# Patient Record
Sex: MALE | Race: WHITE | Employment: FULL TIME | URBAN - NONMETROPOLITAN AREA
[De-identification: names, ages, dates, MRNs, and addresses within clinical notes are randomized per-mention and may not be internally consistent; named-entity substitution may affect disease eponyms.]

---

## 2021-05-18 ENCOUNTER — HOSPITAL ENCOUNTER (EMERGENCY)
Age: 41
Discharge: HOME | End: 2021-05-18
Attending: SPECIALIST
Payer: COMMERCIAL

## 2021-05-18 VITALS
HEIGHT: 67 IN | TEMPERATURE: 100.8 F | HEART RATE: 102 BPM | DIASTOLIC BLOOD PRESSURE: 91 MMHG | SYSTOLIC BLOOD PRESSURE: 138 MMHG | OXYGEN SATURATION: 97 % | RESPIRATION RATE: 16 BRPM | BODY MASS INDEX: 36.88 KG/M2 | WEIGHT: 235 LBS

## 2021-05-18 DIAGNOSIS — R53.83 OTHER FATIGUE: ICD-10-CM

## 2021-05-18 DIAGNOSIS — R50.9 FEVER, UNSPECIFIED FEVER CAUSE: Primary | ICD-10-CM

## 2021-05-18 LAB
-: ABNORMAL
AMORPHOUS: ABNORMAL
BACTERIA: ABNORMAL
BILIRUBIN URINE: NEGATIVE
CASTS UA: ABNORMAL /LPF (ref 0–2)
COLOR: NORMAL
COMMENT UA: NORMAL
CRYSTALS, UA: ABNORMAL /HPF
EPITHELIAL CELLS UA: ABNORMAL /HPF (ref 0–5)
GLUCOSE URINE: NEGATIVE
KETONES, URINE: NEGATIVE
LEUKOCYTE ESTERASE, URINE: NEGATIVE
MUCUS: ABNORMAL
NITRITE, URINE: NEGATIVE
OTHER OBSERVATIONS UA: ABNORMAL
PH UA: 6 (ref 5–6)
PROTEIN UA: NEGATIVE
RBC UA: ABNORMAL /HPF (ref 0–4)
RENAL EPITHELIAL, UA: ABNORMAL /HPF
SPECIFIC GRAVITY UA: 1.02 (ref 1.01–1.02)
TRICHOMONAS: ABNORMAL
TURBIDITY: NORMAL
URINE HGB: NEGATIVE
UROBILINOGEN, URINE: NORMAL
WBC UA: ABNORMAL /HPF (ref 0–4)
YEAST: ABNORMAL

## 2021-05-18 PROCEDURE — 81001 URINALYSIS AUTO W/SCOPE: CPT

## 2021-05-18 PROCEDURE — 99285 EMERGENCY DEPT VISIT HI MDM: CPT

## 2021-05-18 PROCEDURE — 6370000000 HC RX 637 (ALT 250 FOR IP): Performed by: SPECIALIST

## 2021-05-18 RX ORDER — ACETAMINOPHEN 500 MG
1000 TABLET ORAL ONCE
Status: COMPLETED | OUTPATIENT
Start: 2021-05-18 | End: 2021-05-18

## 2021-05-18 RX ORDER — TRAMADOL HYDROCHLORIDE 50 MG/1
50 TABLET ORAL EVERY 6 HOURS PRN
COMMUNITY

## 2021-05-18 RX ADMIN — ACETAMINOPHEN 1000 MG: 500 TABLET, FILM COATED ORAL at 22:13

## 2021-05-18 SDOH — HEALTH STABILITY: MENTAL HEALTH: HOW OFTEN DO YOU HAVE A DRINK CONTAINING ALCOHOL?: NEVER

## 2021-05-18 ASSESSMENT — PAIN DESCRIPTION - DESCRIPTORS: DESCRIPTORS: ACHING

## 2021-05-18 ASSESSMENT — PAIN SCALES - GENERAL: PAINLEVEL_OUTOF10: 7

## 2021-05-18 ASSESSMENT — PAIN DESCRIPTION - ONSET: ONSET: ON-GOING

## 2021-05-18 ASSESSMENT — PAIN DESCRIPTION - PAIN TYPE: TYPE: ACUTE PAIN

## 2021-05-19 ASSESSMENT — ENCOUNTER SYMPTOMS
ABDOMINAL PAIN: 0
SINUS PRESSURE: 0
DIARRHEA: 1
SORE THROAT: 0
SHORTNESS OF BREATH: 0
NAUSEA: 0
COUGH: 0
VOMITING: 0

## 2021-05-19 NOTE — ED PROVIDER NOTES
[address]      Pt Name: Donna Crabtree  MRN: [id number]  [place] 1980  Date of evaluation: 5/18/2021      CHIEF COMPLAINT       Chief Complaint   Patient presents with    Fever    Fatigue         HISTORY OF PRESENT ILLNESS    Donna Crabtree is a 37 y.o. male who presents to the emergency department for evaluation of fever, generalized body ache, sinus drainage and fatigue for last 4 days. Patient states he has been feeling warm but temperature not checked. He has had one episode of diarrhea earlier on the day of evaluation but denies any abdominal pain and denies any nausea or vomiting. He also denies any cough, runny nose or sore throat but has noticed some sinus drainage. He denies any sinus pressure and denies any chest pain, sore throat, cough or earache. He also denies any urinary frequency, urgency, dysuria or hematuria and denies any headache, neck stiffness or skin rash. Patient has had Covid 19 infection about 8 months ago. He has not received any Covid vaccination so far. He denies any sick or ill contacts. Patient has been taking Tylenol and ibuprofen that has been controlling his fever but he has not taken any Tylenol on the day of evaluation. There are no exacerbating or relieving factors. REVIEW OF SYSTEMS       Review of Systems   Constitutional: Positive for fatigue and fever. HENT: Negative for congestion, sinus pressure and sore throat. Respiratory: Negative for cough and shortness of breath. Cardiovascular: Negative for palpitations and leg swelling. Gastrointestinal: Positive for diarrhea. Negative for abdominal pain, nausea and vomiting. Genitourinary: Negative for flank pain, frequency, hematuria and testicular pain. Musculoskeletal: Positive for myalgias. Negative for neck pain and neck stiffness. Neurological: Negative for dizziness and headaches. All other systems reviewed and are negative. PAST MEDICAL HISTORY    has a past medical history of Hypertension. SURGICAL HISTORY      has a past surgical history that includes back surgery. CURRENT MEDICATIONS       Discharge Medication List as of 5/18/2021 10:52 PM      CONTINUE these medications which have NOT CHANGED    Details   traMADol (ULTRAM) 50 MG tablet Take 50 mg by mouth every 6 hours as needed for Pain. Historical Med             ALLERGIES     has No Known Allergies. FAMILY HISTORY     has no family status information on file. family history is not on file. SOCIAL HISTORY      reports that he has never smoked. His smokeless tobacco use includes snuff. He reports that he does not drink alcohol and does not use drugs. PHYSICAL EXAM     INITIAL VITALS:  height is 5' 7\" (1.702 m) and weight is 235 lb (106.6 kg). His tympanic temperature is 100.8 °F (38.2 °C). His blood pressure is 138/91 (abnormal) and his pulse is 102. His respiration is 16 and oxygen saturation is 97%. Physical Exam  Vitals and nursing note reviewed. Constitutional:       Appearance: He is well-developed. HENT:      Head: Normocephalic and atraumatic. Nose: Nose normal.   Eyes:      Extraocular Movements: Extraocular movements intact. Pupils: Pupils are equal, round, and reactive to light. Neck:      Meningeal: Brudzinski's sign absent. Cardiovascular:      Rate and Rhythm: Normal rate and regular rhythm. Heart sounds: Normal heart sounds. No murmur heard. Pulmonary:      Effort: Pulmonary effort is normal. No respiratory distress. Breath sounds: Normal breath sounds. Abdominal:      General: Bowel sounds are normal. There is no distension. Palpations: Abdomen is soft. Tenderness: There is no abdominal tenderness. Musculoskeletal:      Cervical back: Normal range of motion and neck supple. Skin:     General: Skin is warm and dry. Neurological:      General: No focal deficit present.       Mental Status: He is alert and oriented to person, place, and time. GCS: GCS eye subscore is 4. GCS verbal subscore is 5. GCS motor subscore is 6. DIFFERENTIAL DIAGNOSIS/ MDM:     Acute viral illness, COVID-19 infection, UTI    DIAGNOSTIC RESULTS     EKG: All EKG's are interpreted by the Emergency Department Physician who either signs or Co-signs this chart in the absence of a cardiologist.    None obtained      RADIOLOGY:   Interpretation per the Radiologist below, if available at the time of this note:    No results found. ED BEDSIDE ULTRASOUND:       LABS:  Labs Reviewed   MICROSCOPIC URINALYSIS - Abnormal; Notable for the following components:       Result Value    Bacteria, UA TRACE (*)     Mucus, UA 3+ (*)     All other components within normal limits   URINE RT REFLEX TO CULTURE       Urinalysis is unremarkable with no evidence of UTI    EMERGENCY DEPARTMENT COURSE:   Vitals:    Vitals:    05/18/21 2124 05/18/21 2129 05/18/21 2235 05/18/21 2254   BP:  (!) 154/86  (!) 138/91   Pulse: 113   102   Resp: 20   16   Temp: 101.3 °F (38.5 °C)  100.8 °F (38.2 °C)    TempSrc: Tympanic  Tympanic    SpO2: 98%   97%   Weight: 235 lb (106.6 kg)      Height: 5' 7\" (1.702 m)        -------------------------  BP: (!) 138/91, Temp: 100.8 °F (38.2 °C), Pulse: 102, Resp: 16    Orders Placed This Encounter   Medications    acetaminophen (TYLENOL) tablet 1,000 mg       During the emergency department course, patient was given Tylenol 1000 mg orally and his temperature has come down to 100.8 °F.  He is feeling much better and did not develop any new symptoms. He declined Covid antigen swab. Plan is to discharge the patient with instructions drink plenty of oral fluids, take Tylenol and/or ibuprofen as needed for the pain or fever, follow-up with PCP, return if worse. I have reviewed the disposition diagnosis with the patient and or their family/guardian. I have answered their questions and given discharge instructions.

## 2021-05-19 NOTE — ED TRIAGE NOTES
Patient states 4 days ago he started feeling ill with a fever. Patient has been taking tylenol and ibuprofen that has been controlling the fever. Patient also complains of body aches, sinus drainage, and fatigue.